# Patient Record
Sex: FEMALE | Race: WHITE | NOT HISPANIC OR LATINO
[De-identification: names, ages, dates, MRNs, and addresses within clinical notes are randomized per-mention and may not be internally consistent; named-entity substitution may affect disease eponyms.]

---

## 2020-01-30 ENCOUNTER — APPOINTMENT (OUTPATIENT)
Dept: PULMONOLOGY | Facility: CLINIC | Age: 60
End: 2020-01-30
Payer: COMMERCIAL

## 2020-01-30 VITALS — HEIGHT: 57 IN | WEIGHT: 137 LBS | BODY MASS INDEX: 29.56 KG/M2

## 2020-01-30 VITALS — OXYGEN SATURATION: 98 % | HEART RATE: 74 BPM | DIASTOLIC BLOOD PRESSURE: 70 MMHG | SYSTOLIC BLOOD PRESSURE: 120 MMHG

## 2020-01-30 DIAGNOSIS — E03.9 HYPOTHYROIDISM, UNSPECIFIED: ICD-10-CM

## 2020-01-30 DIAGNOSIS — Z80.6 FAMILY HISTORY OF LEUKEMIA: ICD-10-CM

## 2020-01-30 DIAGNOSIS — K45.8 OTHER SPECIFIED ABDOMINAL HERNIA W/OUT OBSTRUCTION OR GANGRENE: ICD-10-CM

## 2020-01-30 DIAGNOSIS — Z85.118 PERSONAL HISTORY OF OTHER MALIGNANT NEOPLASM OF BRONCHUS AND LUNG: ICD-10-CM

## 2020-01-30 DIAGNOSIS — Z01.818 ENCOUNTER FOR OTHER PREPROCEDURAL EXAMINATION: ICD-10-CM

## 2020-01-30 PROCEDURE — 85018 HEMOGLOBIN: CPT | Mod: QW

## 2020-01-30 PROCEDURE — 94010 BREATHING CAPACITY TEST: CPT

## 2020-01-30 PROCEDURE — 99204 OFFICE O/P NEW MOD 45 MIN: CPT | Mod: 25

## 2020-01-30 PROCEDURE — 94729 DIFFUSING CAPACITY: CPT

## 2020-01-30 PROCEDURE — 94727 GAS DIL/WSHOT DETER LNG VOL: CPT

## 2020-01-30 NOTE — PHYSICAL EXAM
[No Acute Distress] : no acute distress [Normal Oropharynx] : normal oropharynx [Normal Appearance] : normal appearance [Normal Rate/Rhythm] : normal rate/rhythm [No Neck Mass] : no neck mass [Normal S1, S2] : normal s1, s2 [No Murmurs] : no murmurs [No Resp Distress] : no resp distress [No Abnormalities] : no abnormalities [Clear to Auscultation Bilaterally] : clear to auscultation bilaterally [Benign] : benign [Surgical scars] : surgical scars [Normal Gait] : normal gait [No Clubbing] : no clubbing [No Cyanosis] : no cyanosis [No Edema] : no edema [FROM] : FROM [Normal Color/ Pigmentation] : normal color/ pigmentation [No Focal Deficits] : no focal deficits [Oriented x3] : oriented x3 [Normal Affect] : normal affect

## 2020-01-30 NOTE — DISCUSSION/SUMMARY
[FreeTextEntry1] : 59-year-old female, status post right lower lobe resection for non-small cell carcinoma in 2007 seen today for pulmonary clearance for lumbar surgery. Following functions are normal. No evidence of lung disease on chest x-ray. No pulmonary contraindication to proposed procedure

## 2020-01-30 NOTE — HISTORY OF PRESENT ILLNESS
[Class I - No Symptoms and No Limitations] : I [TextBox_4] : 59-year-old female, seen today for preop evaluation for proposed lumbar spine fusion. Patient is status post resection of a non-small cell carcinoma in 2006. She denies any complaints of cough, wheeze, shortness of breath, chest pains, palpitations, lightheadedness, dizziness. History is positive for mild snoring, but no witnessed apneas or complaints of excessive daytime somnolence or unrefreshing sleep. History is negative for chest pain, palpitations, leg edema, paroxysmal nocturnal dyspnea, or orthopnea

## 2021-03-02 ENCOUNTER — APPOINTMENT (OUTPATIENT)
Dept: PULMONOLOGY | Facility: CLINIC | Age: 61
End: 2021-03-02
Payer: COMMERCIAL

## 2021-03-02 VITALS
OXYGEN SATURATION: 98 % | SYSTOLIC BLOOD PRESSURE: 126 MMHG | DIASTOLIC BLOOD PRESSURE: 68 MMHG | TEMPERATURE: 97.9 F | HEART RATE: 81 BPM | BODY MASS INDEX: 29.65 KG/M2 | WEIGHT: 137 LBS

## 2021-03-02 DIAGNOSIS — G47.33 OBSTRUCTIVE SLEEP APNEA (ADULT) (PEDIATRIC): ICD-10-CM

## 2021-03-02 PROCEDURE — 99214 OFFICE O/P EST MOD 30 MIN: CPT

## 2021-03-02 PROCEDURE — 99072 ADDL SUPL MATRL&STAF TM PHE: CPT

## 2021-03-02 RX ORDER — DOXYCYCLINE HYCLATE 100 MG/1
100 TABLET ORAL
Qty: 20 | Refills: 0 | Status: DISCONTINUED | COMMUNITY
Start: 2021-01-27

## 2021-03-02 RX ORDER — ZOLPIDEM TARTRATE 10 MG/1
10 TABLET ORAL
Qty: 1 | Refills: 0 | Status: ACTIVE | COMMUNITY
Start: 2021-03-02 | End: 1900-01-01

## 2021-03-02 NOTE — CONSULT LETTER
[Dear  ___] : Dear  [unfilled], [Consult Letter:] : I had the pleasure of evaluating your patient, [unfilled]. [Please see my note below.] : Please see my note below. [Consult Closing:] : Thank you very much for allowing me to participate in the care of this patient.  If you have any questions, please do not hesitate to contact me. [Sincerely,] : Sincerely, [FreeTextEntry3] : Edward Daniels MD FCCP\par Pulmonary/Critical Care/Sleep Medicine\par Department of Internal Medicine\par \par [facility] [employer]\par

## 2021-03-02 NOTE — HISTORY OF PRESENT ILLNESS
[Obstructive Sleep Apnea] : obstructive sleep apnea [Awakes Unrefreshed] : awakes unrefreshed [Awakes with Dry Mouth] : awakes with dry mouth [Daytime Somnolence] : daytime somnolence [Fatigue] : fatigue [Frequent Nocturnal Awakening] : frequent nocturnal awakening [Nonrestorative Sleep] : nonrestorative sleep [Snoring] : snoring [Witnessed Apneas] : witnessed apneas [TextBox_4] : 3months \par URI chronic\par congestion base of neck\par PMD  ABX\par +Gerd\par Increase SOB for 3 months\par ENT Dr Miller 1 yr ago\par  [TextBox_77] : 10pm [TextBox_79] : 6am [TextBox_81] : 5 [ESS] : 12

## 2022-09-22 ENCOUNTER — APPOINTMENT (OUTPATIENT)
Dept: OBGYN | Facility: CLINIC | Age: 62
End: 2022-09-22

## 2022-09-22 VITALS
BODY MASS INDEX: 27.88 KG/M2 | HEIGHT: 60 IN | DIASTOLIC BLOOD PRESSURE: 80 MMHG | WEIGHT: 142 LBS | HEART RATE: 75 BPM | SYSTOLIC BLOOD PRESSURE: 134 MMHG

## 2022-09-22 DIAGNOSIS — U07.1 COVID-19: ICD-10-CM

## 2022-09-22 DIAGNOSIS — Z00.00 ENCOUNTER FOR GENERAL ADULT MEDICAL EXAMINATION W/OUT ABNORMAL FINDINGS: ICD-10-CM

## 2022-09-22 PROCEDURE — 99396 PREV VISIT EST AGE 40-64: CPT

## 2022-09-22 NOTE — HISTORY OF PRESENT ILLNESS
[FreeTextEntry1] : Patient is a 62-year-old  2 para 2 last menstrual period age 55\par Patient presents for annual visit complaining of some left breast tenderness over the past 3 months according the patient,,, mild

## 2022-09-22 NOTE — PHYSICAL EXAM
[Chaperone Present] : A chaperone was present in the examining room during all aspects of the physical examination [Appropriately responsive] : appropriately responsive [Alert] : alert [No Acute Distress] : no acute distress [No Lymphadenopathy] : no lymphadenopathy [Regular Rate Rhythm] : regular rate rhythm [Clear to Auscultation B/L] : clear to auscultation bilaterally [Soft] : soft [Non-tender] : non-tender [Non-distended] : non-distended [No HSM] : No HSM [No Lesions] : no lesions [No Mass] : no mass [Examination Of The Breasts] : a normal appearance [No Masses] : no breast masses were palpable [Labia Majora] : normal [Labia Minora] : normal [Normal] : normal [Anteversion] : anteverted [Uterine Adnexae] : normal [FreeTextEntry6] : No masses, nontender, no skin change, no nipple discharge, no adenopathy. [Tenderness] : nontender [Mass ___ cm] : no uterine mass was palpated

## 2022-09-22 NOTE — PLAN
[FreeTextEntry1] : Patient is a 62-year-old  2 para 2 last menstrual period age 55\par Patient presents for annual visit,,, complaints of left breast tenderness according the patient mild over the past 3 months\par Patient denies any nipple discharge\par Patient states that she underwent a breast mammogram and sonogram approximately 2 3 weeks ago with benign findings and was advised to have a follow-up in 6 months\par Patient has been asked to provide a copy of the results for review and any further treatment changes or further evaluation\par Physical exam reveals a well-developed well-nourished female no apparent distress,,, BMI 27\par Heart regular rhythm and rate, lungs clear, breast no masses nontender no skin change or nipple discharge no adenopathy, abdomen soft nontender no organomegaly.\par Pelvic exam shows normal female external genitalia atrophic, vagina no lesions atrophic, cervix appropriate size nontender, uterus anteverted normal size nontender, adnexa no mass nontender.\par Pap smear performed\par Prescription for bone density given\par Patient states she was diagnosed with COVID back in ,,, denies any residual symptoms or deficits,,, and states she has been both vaccinated boosted\par Patient states your colonoscopy back in approximately 1 year prior with negative findings\par Benign GYN exam\par As stated we will review mammo and sono results for any further evaluation and treatment\par Follow-up 1 year or prior to that as needed

## 2022-10-04 LAB — CYTOLOGY CVX/VAG DOC THIN PREP: ABNORMAL

## 2023-10-06 PROBLEM — Z82.49 FAMILY HISTORY OF PREMATURE CAD: Status: ACTIVE | Noted: 2023-10-06

## 2023-10-06 PROBLEM — Z92.89 H/O ECHOCARDIOGRAM: Status: RESOLVED | Noted: 2023-10-06 | Resolved: 2023-10-06

## 2023-10-06 PROBLEM — R07.89 OTHER CHEST PAIN: Status: ACTIVE | Noted: 2023-10-06

## 2023-10-06 PROBLEM — R94.31 ABNORMAL ECG: Status: ACTIVE | Noted: 2023-10-06

## 2023-10-06 PROBLEM — E78.5 HYPERLIPIDEMIA, UNSPECIFIED: Status: ACTIVE | Noted: 2023-10-06

## 2023-10-06 RX ORDER — LEVOTHYROXINE SODIUM 0.07 MG/1
75 TABLET ORAL DAILY
Refills: 0 | Status: ACTIVE | COMMUNITY

## 2023-10-06 RX ORDER — CELECOXIB 200 MG/1
200 CAPSULE ORAL DAILY
Refills: 0 | Status: ACTIVE | COMMUNITY

## 2023-10-06 RX ORDER — COLESEVELAM HYDROCHLORIDE 3.75 G/1
3.75 FOR SUSPENSION ORAL
Refills: 0 | Status: ACTIVE | COMMUNITY

## 2023-10-06 RX ORDER — OMEGA-3-ACID ETHYL ESTERS 1 G/1
1 CAPSULE, LIQUID FILLED ORAL
Refills: 0 | Status: ACTIVE | COMMUNITY

## 2023-10-09 ENCOUNTER — NON-APPOINTMENT (OUTPATIENT)
Age: 63
End: 2023-10-09

## 2023-10-09 DIAGNOSIS — Z82.49 FAMILY HISTORY OF ISCHEMIC HEART DISEASE AND OTHER DISEASES OF THE CIRCULATORY SYSTEM: ICD-10-CM

## 2023-10-09 DIAGNOSIS — Z92.89 PERSONAL HISTORY OF OTHER MEDICAL TREATMENT: ICD-10-CM

## 2023-10-09 DIAGNOSIS — R07.89 OTHER CHEST PAIN: ICD-10-CM

## 2023-10-09 DIAGNOSIS — E78.5 HYPERLIPIDEMIA, UNSPECIFIED: ICD-10-CM

## 2023-10-09 DIAGNOSIS — R94.31 ABNORMAL ELECTROCARDIOGRAM [ECG] [EKG]: ICD-10-CM
